# Patient Record
Sex: FEMALE | Race: OTHER | NOT HISPANIC OR LATINO
[De-identification: names, ages, dates, MRNs, and addresses within clinical notes are randomized per-mention and may not be internally consistent; named-entity substitution may affect disease eponyms.]

---

## 2018-12-06 PROBLEM — Z00.00 ENCOUNTER FOR PREVENTIVE HEALTH EXAMINATION: Status: ACTIVE | Noted: 2018-12-06

## 2018-12-20 ENCOUNTER — APPOINTMENT (OUTPATIENT)
Dept: OPHTHALMOLOGY | Facility: CLINIC | Age: 43
End: 2018-12-20

## 2018-12-20 ENCOUNTER — OUTPATIENT (OUTPATIENT)
Dept: OUTPATIENT SERVICES | Facility: HOSPITAL | Age: 43
LOS: 1 days | End: 2018-12-20

## 2018-12-20 DIAGNOSIS — H18.609 KERATOCONUS, UNSPECIFIED, UNSPECIFIED EYE: ICD-10-CM

## 2019-10-18 ENCOUNTER — EMERGENCY (EMERGENCY)
Facility: HOSPITAL | Age: 44
LOS: 1 days | Discharge: HOME | End: 2019-10-18
Admitting: EMERGENCY MEDICINE
Payer: COMMERCIAL

## 2019-10-18 VITALS
WEIGHT: 119.93 LBS | DIASTOLIC BLOOD PRESSURE: 53 MMHG | RESPIRATION RATE: 18 BRPM | HEIGHT: 66 IN | OXYGEN SATURATION: 97 % | SYSTOLIC BLOOD PRESSURE: 96 MMHG | HEART RATE: 100 BPM | TEMPERATURE: 98 F

## 2019-10-18 DIAGNOSIS — R07.81 PLEURODYNIA: ICD-10-CM

## 2019-10-18 DIAGNOSIS — R05 COUGH: ICD-10-CM

## 2019-10-18 PROCEDURE — 99283 EMERGENCY DEPT VISIT LOW MDM: CPT | Mod: 25

## 2019-10-18 PROCEDURE — 71100 X-RAY EXAM RIBS UNI 2 VIEWS: CPT | Mod: 26,LT

## 2019-10-18 RX ORDER — CYCLOBENZAPRINE HYDROCHLORIDE 10 MG/1
10 TABLET, FILM COATED ORAL ONCE
Refills: 0 | Status: COMPLETED | OUTPATIENT
Start: 2019-10-18 | End: 2019-10-18

## 2019-10-18 RX ORDER — CYCLOBENZAPRINE HYDROCHLORIDE 10 MG/1
1 TABLET, FILM COATED ORAL
Qty: 15 | Refills: 0
Start: 2019-10-18

## 2019-10-18 RX ADMIN — CYCLOBENZAPRINE HYDROCHLORIDE 10 MILLIGRAM(S): 10 TABLET, FILM COATED ORAL at 21:43

## 2019-10-18 NOTE — ED PROVIDER NOTE - NSFOLLOWUPINSTRUCTIONS_ED_ALL_ED_FT
Continue antibiotics and cough medication prescribed by your primary doctor.  Take flexeril as needed for muscular pain- this medication can make you drowsy so please do not drive, operate heavy machinery or mix with alcohol.  Follow up with your PMD Dr. Borrego within one week.  Return to ED for any difficulty breathing, fever, vomiting, coughing up blood or fainting    Cough    Coughing is a reflex that clears your throat and your airways. Coughing helps to heal and protect your lungs. It is normal to cough occasionally, but a cough that happens with other symptoms or lasts a long time may be a sign of a condition that needs treatment. Coughing may be caused by infections, asthma or COPD, smoking, postnasal drip, gastroesophageal reflux, as well as other medical conditions. Take medicines only as instructed by your health care provider. Avoid environments or triggers that causes you to cough at work or at home.    SEEK IMMEDIATE MEDICAL CARE IF YOU HAVE ANY OF THE FOLLOWING SYMPTOMS: coughing up blood, shortness of breath, rapid heart rate, chest pain, unexplained weight loss or night sweats.    Strain    A strain is a stretch or tear in one of the muscles in your body. This is caused by an injury to the area such as a twisting mechanism. Symptoms include pain, swelling, or bruising. Rest that area over the next several days and slowly resume activity when tolerated. Ice can help with swelling and pain.     SEEK IMMEDIATE MEDICAL CARE IF YOU HAVE ANY OF THE FOLLOWING SYMPTOMS: worsening pain, inability to move that body part, numbness or tingling.

## 2019-10-18 NOTE — ED PROVIDER NOTE - PHYSICAL EXAMINATION
Vitals reviewed  Gen: speaking in full sentences with intermittent coughing, nad  Skin: wwp, no rash  HEENT: ncat, mmm  Back: ttp L inferior lateral rib, no deformity/crepitus  CV: rrr  Resp: symmetrical expansion, ctab, no w/r/r  Ext: FROM throughout, no peripheral edema/calf ttp  Neuro: alert/oriented, no focal deficits, steady gait

## 2019-10-18 NOTE — ED PROVIDER NOTE - CLINICAL SUMMARY MEDICAL DECISION MAKING FREE TEXT BOX
44 F denies pmh p/w L posterior rib pain s/p coughing this afternoon.  rib dx shows no acute fracture, no pneumothorax noted and on exam b/l equal breath sounds.  likely MSK strain as pain resolved with flexeril.  rx flexeril sent and instructed to continue other medications prescribed by pmd.  pt will follow up with pmd Dr. Borrego in one week, discussed strict return parameters.

## 2019-10-18 NOTE — ED PROVIDER NOTE - DIAGNOSTIC INTERPRETATION
ER PA: Phuong Art, reviewed with MD James  INTERPRETATION: no acute fracture-cortex intact; lungs clear, normal bony alignment. ER PA: Phuong Art, reviewed with MD James  INTERPRETATION: no acute fracture-cortex intact; lungs clear- no pneumothorax, normal bony alignment.

## 2019-10-18 NOTE — ED ADULT NURSE NOTE - CHPI ED NUR SYMPTOMS NEG
no body aches/no chills/no diaphoresis/no edema/no fever/no headache/no hemoptysis/no shortness of breath

## 2019-10-18 NOTE — ED ADULT NURSE NOTE - NSIMPLEMENTINTERV_GEN_ALL_ED
Implemented All Universal Safety Interventions:  [place] to call system. Call bell, personal items and telephone within reach. Instruct patient to call for assistance. Room bathroom lighting operational. Non-slip footwear when patient is off stretcher. Physically safe environment: no spills, clutter or unnecessary equipment. Stretcher in lowest position, wheels locked, appropriate side rails in place.

## 2019-10-18 NOTE — ED PROVIDER NOTE - PATIENT PORTAL LINK FT
You can access the FollowMyHealth Patient Portal offered by [facility] by registering at the following website: [email or web address]. By joining [facility]’s FollowMyHealth portal, you will also be able to view your health information using other applications (apps) compatible with our system.

## 2019-10-18 NOTE — ED PROVIDER NOTE - OBJECTIVE STATEMENT
44 F denies pmh p/w L posterior rib pain s/p coughing this afternoon.  Pt reports she has been dealing with bronchitis for the past month, currently taking augmentin and tessalon rx by PMD with improvement.  States this afternoon she had an episode of prolonged coughing then felt something pop in L lower ribs, concerned she broke a rib.  she has not taken anything for pain.  Denies fever, difficulty breathing, hemoptysis, abd pain, nvd, trauma

## 2019-10-18 NOTE — ED ADULT TRIAGE NOTE - CHIEF COMPLAINT QUOTE
I cough forcefully this evening and I think I broke my left lower rib;   with bronchitis - currently on   Rx meds    (cough med and antibiotic - cant recall name)